# Patient Record
Sex: FEMALE | Race: WHITE | NOT HISPANIC OR LATINO | Employment: PART TIME | URBAN - METROPOLITAN AREA
[De-identification: names, ages, dates, MRNs, and addresses within clinical notes are randomized per-mention and may not be internally consistent; named-entity substitution may affect disease eponyms.]

---

## 2017-01-10 ENCOUNTER — LAB (OUTPATIENT)
Dept: LAB | Facility: HOSPITAL | Age: 57
End: 2017-01-10

## 2017-01-10 ENCOUNTER — CONSULT (OUTPATIENT)
Dept: ONCOLOGY | Facility: CLINIC | Age: 57
End: 2017-01-10

## 2017-01-10 VITALS
HEART RATE: 82 BPM | WEIGHT: 178.8 LBS | TEMPERATURE: 98.1 F | DIASTOLIC BLOOD PRESSURE: 82 MMHG | RESPIRATION RATE: 16 BRPM | SYSTOLIC BLOOD PRESSURE: 142 MMHG | BODY MASS INDEX: 31.68 KG/M2 | HEIGHT: 63 IN

## 2017-01-10 DIAGNOSIS — R53.82 CHRONIC FATIGUE: ICD-10-CM

## 2017-01-10 DIAGNOSIS — Z86.718 HISTORY OF DEEP VENOUS THROMBOSIS (DVT) OF DISTAL VEIN OF LEFT LOWER EXTREMITY: ICD-10-CM

## 2017-01-10 DIAGNOSIS — T14.8XXA BRUISING: Primary | ICD-10-CM

## 2017-01-10 LAB
BASOPHILS # BLD AUTO: 0.05 10*3/MM3 (ref 0–0.1)
BASOPHILS NFR BLD AUTO: 0.8 % (ref 0–1.1)
DEPRECATED RDW RBC AUTO: 34.7 FL (ref 37–49)
EOSINOPHIL # BLD AUTO: 0.17 10*3/MM3 (ref 0–0.36)
EOSINOPHIL NFR BLD AUTO: 2.7 % (ref 1–5)
ERYTHROCYTE [DISTWIDTH] IN BLOOD BY AUTOMATED COUNT: 12.1 % (ref 11.7–14.5)
HCT VFR BLD AUTO: 40 % (ref 34–45)
HGB BLD-MCNC: 13.8 G/DL (ref 11.5–14.9)
IMM GRANULOCYTES # BLD: 0.02 10*3/MM3 (ref 0–0.03)
IMM GRANULOCYTES NFR BLD: 0.3 % (ref 0–0.5)
LYMPHOCYTES # BLD AUTO: 2.91 10*3/MM3 (ref 1–3.5)
LYMPHOCYTES NFR BLD AUTO: 46 % (ref 20–49)
MCH RBC QN AUTO: 27.7 PG (ref 27–33)
MCHC RBC AUTO-ENTMCNC: 34.5 G/DL (ref 32–35)
MCV RBC AUTO: 80.2 FL (ref 83–97)
MONOCYTES # BLD AUTO: 0.31 10*3/MM3 (ref 0.25–0.8)
MONOCYTES NFR BLD AUTO: 4.9 % (ref 4–12)
NEUTROPHILS # BLD AUTO: 2.87 10*3/MM3 (ref 1.5–7)
NEUTROPHILS NFR BLD AUTO: 45.3 % (ref 39–75)
NRBC BLD MANUAL-RTO: 0 /100 WBC (ref 0–0)
PLATELET # BLD AUTO: 225 10*3/MM3 (ref 150–375)
PMV BLD AUTO: 10.5 FL (ref 8.9–12.1)
RBC # BLD AUTO: 4.99 10*6/MM3 (ref 3.9–5)
WBC NRBC COR # BLD: 6.33 10*3/MM3 (ref 4–10)

## 2017-01-10 PROCEDURE — 85025 COMPLETE CBC W/AUTO DIFF WBC: CPT | Performed by: INTERNAL MEDICINE

## 2017-01-10 PROCEDURE — 36416 COLLJ CAPILLARY BLOOD SPEC: CPT | Performed by: INTERNAL MEDICINE

## 2017-01-10 PROCEDURE — 99244 OFF/OP CNSLTJ NEW/EST MOD 40: CPT | Performed by: INTERNAL MEDICINE

## 2017-01-10 RX ORDER — CETIRIZINE HYDROCHLORIDE 10 MG/1
10 TABLET ORAL DAILY
COMMUNITY

## 2017-01-10 NOTE — PROGRESS NOTES
Subjective     REASON FOR CONSULTATION:  Provide an opinion on any further workup or treatment on:    Easy bruisability                       REQUESTING PHYSICIAN: Mookie Ramos MD      RECORDS OBTAINED:  Records of the patients history including those obtained from the referring provider were reviewed and summarized in detail.    HISTORY OF PRESENT ILLNESS:      Shannon IQBAL is a 56 y.o. patient was referred by Mookie Ramos MD for evaluation of easy bruisability.  Patient reports noticing bruises over multiple areas of her body over the past 2 years.  She does not remember a precipitating trauma.  She is not having problems with bleeding except for some gum bleeding.    The patient has history of left leg DVT 2 years ago that was treated with warfarin for 3 months.  She did not have excessive bruising while on warfarin.      Patient reports current headaches.  She takes Aleve when necessary for the headaches.  She usually takes 2 tablets but she does not take Aleve on a regular basis.    Past Medical History   Diagnosis Date   • Migraine        No past surgical history on file.  D&C on 8/1/2008.  Right breast biopsy on 6 2010 for a benign lesion.  Thyroid biopsy on 12/10/2015 for a benign lesion.    MEDICATIONS:    Current Outpatient Prescriptions:   •  cetirizine (zyrTEC) 10 MG tablet, Take 10 mg by mouth Daily., Disp: , Rfl:   •  Propranolol HCl (INDERAL PO), Take  by mouth., Disp: , Rfl:      ALLERGIES:  No Known Allergies     Social History     Social History   • Marital status:      Spouse name: N/A   • Number of children: N/A   • Years of education: N/A     Occupational History   •  [employer]     Social History Main Topics   • Smoking status: Never Smoker   • Smokeless tobacco: Not on file   • Alcohol use Not on file   • Drug use: Not on file   • Sexual activity: Not on file     Other Topics Concern   • Not on file     Social History Narrative       Cancer-related family history is not on file. no  "history of bleeding disorder.    REVIEW OF SYSTEMS:  GENERAL:  No fever or chills. No weight change.  Fatigue.  SKIN:  No rashes or lesions.  HEME/LYMPH: No anemia, easy bruisability or enlarged lymph nodes.  EYES:  No vision changes or diplopia.  ENT:  No mouth or gum bleeding, epistaxis, hoarseness or dysphagia.  RESPIRATORY:  No cough, shortness of breath, hemoptysis or wheezing.  CVS:  Episodes of sharp sided chest pain with abduction of the arm.  GI:  No abdominal pain, nausea, vomiting, constipation, diarrhea, melena or hematochezia.  :  No dysuria, hematuria or increased frequency.   MUSCULOSKELETAL:  Pain in the left knee.  NEUROLOGICAL:  No dizziness, global weakness, loss of consciousness or seizures.  PSYCHIATRIC:  No anxiety, mood changes or depression.  .    Objective   VITAL SIGNS:  Vitals:    01/10/17 0824   BP: 142/82   Pulse: 82   Resp: 16   Temp: 98.1 °F (36.7 °C)   Weight: 178 lb 12.8 oz (81.1 kg)   Height: 62.99\" (160 cm)  Comment: without shoes   PainSc: 5  Comment: pain from left breast       Wt Readings from Last 3 Encounters:   01/10/17 178 lb 12.8 oz (81.1 kg)       PHYSICAL EXAMINATION  GENERAL:  The patient appears in good general condition not in acute distress.  SKIN:  No skin rashes or lesions. No ecchymosis or petechiae.  Changes from a previous ecchymosis over the right leg proximally.  HEAD:  Normocephalic.  EYES: No jaundice. No pallor.   NECK:  Supple with good ROM. No thyromegaly or masses.  LYMPHATICS:  No cervical, supraclavicular or axillary lymphadenopathy.  CHEST:  Lungs clear to auscultation. No added sounds.  CARDIAC:  Normal S1 & S2. No murmurs.  ABDOMEN:  Soft and non-tender. No palpable hepatosplenomegaly or masses.  EXTREMITIES:  No cyanosis or edema. No calf tenderness.  NEUROLOGICAL:  No focal neurological deficits.      RESULT REVIEW:     Results from last 7 days  Lab Units 01/10/17  0729   WBC 10*3/mm3 6.33   NEUTROS ABS 10*3/mm3 2.87   HEMOGLOBIN g/dL 13.8 "   HEMATOCRIT % 40.0   PLATELETS 10*3/mm3 225           Assessment/Plan   1.  Easy bruisability.  Started about 2 years ago.  Patient reports having bruises over her upper and lower extremities without any precipitating trauma.  She has no prior history of bleeding.  She had thyroid and breast biopsies and she had 3 normal vaginal deliveries without problems with bleeding.  On review of her medication history, she takes Aleve when necessary for headaches and this may be contributing to platelet dysfunction.  Family history is negative for bleeding disorder.  I asked her to stop taking Aleve and to take Tylenol instead.  I also recommended blood workup with platelet function analysis, PT, PTT and von Willebrand panel to be done on 1/13/17.  She would be about 10 days from the last time she took Aleve.    2.  Fatigue.  This is concerning for iron or B12 deficiency.  I'll obtain ferritin, iron panel and B12 levels.    3.  History of left leg DVT 2 years ago.  She was anticoagulated with Coumadin for 3 months and she is currently asymptomatic.    4.  Patient reports pain in the left side of the chest that is sharp and develops when she moves her arm and she also reports pain in the left knee with locking feeling.  I asked her to see her PCP soon for further evaluation.    We will see the patient follow-up in 3 weeks to review the results.        Malik Mccormack MD  01/10/17

## 2017-01-13 ENCOUNTER — APPOINTMENT (OUTPATIENT)
Dept: LAB | Facility: HOSPITAL | Age: 57
End: 2017-01-13

## 2017-01-16 ENCOUNTER — LAB (OUTPATIENT)
Dept: LAB | Facility: HOSPITAL | Age: 57
End: 2017-01-16

## 2017-01-16 DIAGNOSIS — T14.8XXA BRUISING: ICD-10-CM

## 2017-01-16 DIAGNOSIS — R53.82 CHRONIC FATIGUE: ICD-10-CM

## 2017-01-16 LAB
CLOSURE TME COLL+ADP BLD: 63 SECONDS (ref 52–122)
CLOSURE TME COLL+EPINEP BLD: 120 SECONDS (ref 68–148)
FERRITIN SERPL-MCNC: 78.6 NG/ML (ref 11–207)
INR PPP: 0.95 (ref 0.9–1.1)
IRON 24H UR-MRATE: 65 MCG/DL (ref 37–145)
IRON SATN MFR SERPL: 19 % (ref 14–48)
PROTHROMBIN TIME: 12.3 SECONDS (ref 11.7–14.2)
TIBC SERPL-MCNC: 336 MCG/DL (ref 249–505)
TRANSFERRIN SERPL-MCNC: 240 MG/DL (ref 200–360)
VIT B12 BLD-MCNC: 659 PG/ML (ref 250–999)

## 2017-01-16 PROCEDURE — 85610 PROTHROMBIN TIME: CPT | Performed by: INTERNAL MEDICINE

## 2017-01-16 PROCEDURE — 84466 ASSAY OF TRANSFERRIN: CPT | Performed by: INTERNAL MEDICINE

## 2017-01-16 PROCEDURE — 36415 COLL VENOUS BLD VENIPUNCTURE: CPT | Performed by: INTERNAL MEDICINE

## 2017-01-16 PROCEDURE — 83540 ASSAY OF IRON: CPT | Performed by: INTERNAL MEDICINE

## 2017-01-16 PROCEDURE — 82607 VITAMIN B-12: CPT | Performed by: INTERNAL MEDICINE

## 2017-01-16 PROCEDURE — 82728 ASSAY OF FERRITIN: CPT | Performed by: INTERNAL MEDICINE

## 2017-01-16 PROCEDURE — 85576 BLOOD PLATELET AGGREGATION: CPT | Performed by: INTERNAL MEDICINE

## 2017-01-18 LAB
FACT VIII ACT/NOR PPP: 157 % (ref 57–163)
Lab: NORMAL
VW INTERPRETATION: NORMAL
VWF AG ACT/NOR PPP IA: 148 % (ref 50–200)
VWF CP PPP QL: 134 % (ref 50–200)

## 2017-01-30 ENCOUNTER — TRANSCRIBE ORDERS (OUTPATIENT)
Dept: MAMMOGRAPHY | Facility: HOSPITAL | Age: 57
End: 2017-01-30

## 2017-01-30 DIAGNOSIS — Z12.31 VISIT FOR SCREENING MAMMOGRAM: Primary | ICD-10-CM

## 2017-02-13 ENCOUNTER — APPOINTMENT (OUTPATIENT)
Dept: LAB | Facility: HOSPITAL | Age: 57
End: 2017-02-13

## 2017-02-13 ENCOUNTER — OFFICE VISIT (OUTPATIENT)
Dept: ONCOLOGY | Facility: CLINIC | Age: 57
End: 2017-02-13

## 2017-02-13 VITALS
SYSTOLIC BLOOD PRESSURE: 152 MMHG | TEMPERATURE: 98.1 F | WEIGHT: 179.8 LBS | BODY MASS INDEX: 31.86 KG/M2 | HEART RATE: 74 BPM | DIASTOLIC BLOOD PRESSURE: 92 MMHG | OXYGEN SATURATION: 95 % | HEIGHT: 63 IN | RESPIRATION RATE: 16 BRPM

## 2017-02-13 DIAGNOSIS — Z86.718 HISTORY OF DEEP VENOUS THROMBOSIS (DVT) OF DISTAL VEIN OF LEFT LOWER EXTREMITY: ICD-10-CM

## 2017-02-13 DIAGNOSIS — T14.8XXA BRUISING: Primary | ICD-10-CM

## 2017-02-13 DIAGNOSIS — E61.1 IRON DEFICIENCY: ICD-10-CM

## 2017-02-13 PROCEDURE — G0463 HOSPITAL OUTPT CLINIC VISIT: HCPCS | Performed by: INTERNAL MEDICINE

## 2017-02-13 PROCEDURE — 99214 OFFICE O/P EST MOD 30 MIN: CPT | Performed by: INTERNAL MEDICINE

## 2017-02-13 NOTE — PROGRESS NOTES
Subjective     REASON FOR FOLLOW UP:      · Easy bruisability  · Fatigue                         HISTORY OF PRESENT ILLNESS:      Shannon IQBAL is a 56 y.o. patient who was referred by Mookie Ramos MD for evaluation of easy bruisability.      Patient returns today for follow-up.  She is not having problems with bruising at present.  She continues feeling tired.  She was experiencing chest pain several weeks ago but this has resolved.    Patient is not noticing blood in the stool.  She had a colonoscopy in February 2013.  A polyp was excised from the cecum and pathology examination revealed a hyperplastic polyp.    Patient reports intermittent muscle and joint pain.  Tylenol is usually not effective and she usually gets better results with Aleve.      Patient reports occasional dizzy spells.  She is having high blood pressure readings.    Past Medical History   Diagnosis Date   • Arthritis    • H/O foreign travel 08/2016     [place]   • Migraine        No past surgical history on file.  D&C on 8/1/2008.  Right breast biopsy on 6 2010 for a benign lesion.  Thyroid biopsy on 12/10/2015 for a benign lesion.    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 No history of bleeding disorder.    REVIEW OF SYSTEMS:  GENERAL:  No fever or chills. No weight change.   "Fatigue.  SKIN:  No rashes or lesions.  HEME/LYMPH: Easy bruisability.  RESPIRATORY:  No cough, shortness of breath, hemoptysis or wheezing.  CVS:  Chest pain has resolved.  GI:  No abdominal pain, nausea, vomiting, constipation, diarrhea, melena or hematochezia.  :  No dysuria, hematuria or increased frequency.   MUSCULOSKELETAL:  Pain in the left knee.  NEUROLOGICAL:  Episodes of dizziness.  PSYCHIATRIC:  No anxiety, mood changes or depression.  .    Objective   VITAL SIGNS:  Vitals:    02/13/17 1122   BP: 152/92   Pulse: 74   Resp: 16   Temp: 98.1 °F (36.7 °C)   TempSrc: Oral   SpO2: 95%   Weight: 179 lb 12.8 oz (81.6 kg)   Height: 62.99\" (160 cm)   PainSc: 0-No pain       Wt Readings from Last 3 Encounters:   02/13/17 179 lb 12.8 oz (81.6 kg)   01/10/17 178 lb 12.8 oz (81.1 kg)       PHYSICAL EXAMINATION  GENERAL:  The patient appears in good general condition not in acute distress.  SKIN:  No skin rashes or lesions. No ecchymosis or petechiae.    EXTREMITIES:  No cyanosis or edema. No calf tenderness.  NEUROLOGICAL:  No focal neurological deficits.      RESULT REVIEW:     Results for orders placed or performed in visit on 01/16/17   Ferritin   Result Value Ref Range    Ferritin 78.60 11.00 - 207.00 ng/mL   Iron Profile   Result Value Ref Range    Iron 65 37 - 145 mcg/dL    Iron Saturation 19 14 - 48 %    Transferrin 240 200 - 360 mg/dL    TIBC 336 249 - 505 mcg/dL   Vitamin B12   Result Value Ref Range    Vitamin B-12 659 250 - 999 pg/mL   Platelet Function Test   Result Value Ref Range    Collagen/Epinephrine 120 68 - 148 Seconds    Collagen/ADP 63 52 - 122 Seconds   Protime-INR   Result Value Ref Range    Protime 12.3 11.7 - 14.2 Seconds    INR 0.95 0.90 - 1.10   Von Willebrand Panel   Result Value Ref Range    Factor VIII Activity 157 57 - 163 %    Von Willebrand Ag 148 50 - 200 %    VWF Activity 134 50 - 200 %   Coag Studies Interp Report   Result Value Ref Range    von Willebrand Interp Note     PDF " Image .        Assessment/Plan   1.  Easy bruisability.  Started about 2 years ago.  Patient reports having bruises over her upper and lower extremities without any precipitating trauma.  She has no prior history of bleeding.  She had thyroid and breast biopsies and she had 3 normal vaginal deliveries without problems with bleeding.  On review of her medication history, she takes Aleve when necessary for headaches and this may be contributing to platelet dysfunction.  Family history is negative for bleeding disorder.  I asked her to stop taking Aleve and to take Tylenol instead.  I obtained a blood workup with platelet function analysis, PT, PTT and von Willebrand panel and the results are normal.  I reassured the patient that there is no evidence of underlying bleeding disorder.  I would not recommend additional workup.  She states that Tylenol does not usually work for her pain.  I asked her to use as little of Aleve as possible.    2.  Fatigue.  Transferrin saturation is 19% and ferritin is below 100.  I asked her to take oral iron once a day for 3 months.  She had a colonoscopy in 2013 which revealed a polyp that was excised pathology examination showing a hyperplastic polyp.    3.  History of left leg DVT 2 years ago.      4.  Episodes of dizziness.  She has elevated blood pressure and had elevated blood pressure readings during the episodes of dizziness.  I asked her to see her PCP soon for this.      Since the patient does not have evidence of a bleeding disorder, I did not recommend additional follow-up at our office.  She will see her PCP in follow-up soon regarding her elevated BP.        Malik Mccormack MD  02/13/17

## 2017-02-21 ENCOUNTER — HOSPITAL ENCOUNTER (OUTPATIENT)
Dept: MAMMOGRAPHY | Facility: HOSPITAL | Age: 57
Discharge: HOME | End: 2017-02-21
Admitting: OBSTETRICS & GYNECOLOGY

## 2017-02-21 DIAGNOSIS — Z12.31 VISIT FOR SCREENING MAMMOGRAM: ICD-10-CM

## 2017-02-21 PROCEDURE — G0202 SCR MAMMO BI INCL CAD: HCPCS

## 2018-05-03 ENCOUNTER — TRANSCRIBE ORDERS (OUTPATIENT)
Dept: ADMINISTRATIVE | Facility: HOSPITAL | Age: 58
End: 2018-05-03

## 2018-05-03 DIAGNOSIS — Z12.31 SCREENING MAMMOGRAM, ENCOUNTER FOR: Primary | ICD-10-CM

## 2018-05-17 ENCOUNTER — HOSPITAL ENCOUNTER (OUTPATIENT)
Dept: MAMMOGRAPHY | Facility: HOSPITAL | Age: 58
Discharge: HOME | End: 2018-05-17
Admitting: OBSTETRICS & GYNECOLOGY

## 2018-05-17 DIAGNOSIS — Z12.31 SCREENING MAMMOGRAM, ENCOUNTER FOR: ICD-10-CM

## 2018-05-17 PROCEDURE — 77067 SCR MAMMO BI INCL CAD: CPT

## 2018-05-17 PROCEDURE — 77063 BREAST TOMOSYNTHESIS BI: CPT

## 2019-04-05 ENCOUNTER — TRANSCRIBE ORDERS (OUTPATIENT)
Dept: ADMINISTRATIVE | Facility: HOSPITAL | Age: 59
End: 2019-04-05

## 2019-04-05 DIAGNOSIS — Z12.31 VISIT FOR SCREENING MAMMOGRAM: Primary | ICD-10-CM

## 2019-05-21 ENCOUNTER — APPOINTMENT (OUTPATIENT)
Dept: MAMMOGRAPHY | Facility: HOSPITAL | Age: 59
End: 2019-05-21

## 2019-05-22 ENCOUNTER — HOSPITAL ENCOUNTER (OUTPATIENT)
Dept: MAMMOGRAPHY | Facility: HOSPITAL | Age: 59
Discharge: HOME | End: 2019-05-22
Admitting: SPECIALIST

## 2019-05-22 DIAGNOSIS — Z12.31 VISIT FOR SCREENING MAMMOGRAM: ICD-10-CM

## 2019-05-22 PROCEDURE — 77067 SCR MAMMO BI INCL CAD: CPT

## 2019-05-22 PROCEDURE — 77063 BREAST TOMOSYNTHESIS BI: CPT

## 2025-07-28 ENCOUNTER — TRANSCRIBE ORDERS (OUTPATIENT)
Dept: LAB | Facility: HOSPITAL | Age: 65
End: 2025-07-28
Payer: COMMERCIAL

## 2025-07-28 ENCOUNTER — HOSPITAL ENCOUNTER (OUTPATIENT)
Dept: CARDIOLOGY | Facility: HOSPITAL | Age: 65
Discharge: HOME | End: 2025-07-28
Payer: COMMERCIAL

## 2025-07-28 ENCOUNTER — LAB (OUTPATIENT)
Dept: LAB | Facility: HOSPITAL | Age: 65
End: 2025-07-28
Payer: COMMERCIAL

## 2025-07-28 DIAGNOSIS — R22.0 CHEEK MASS: ICD-10-CM

## 2025-07-28 DIAGNOSIS — R22.0 CHEEK MASS: Primary | ICD-10-CM

## 2025-07-28 LAB
ANION GAP SERPL CALCULATED.3IONS-SCNC: 11.4 MMOL/L (ref 5–15)
BASOPHILS # BLD AUTO: 0.06 10*3/MM3 (ref 0–0.2)
BASOPHILS NFR BLD AUTO: 0.8 % (ref 0–1.5)
BUN SERPL-MCNC: 12 MG/DL (ref 8–23)
BUN/CREAT SERPL: 15.4 (ref 7–25)
CALCIUM SPEC-SCNC: 9.5 MG/DL (ref 8.6–10.5)
CHLORIDE SERPL-SCNC: 104 MMOL/L (ref 98–107)
CO2 SERPL-SCNC: 27.6 MMOL/L (ref 22–29)
CREAT SERPL-MCNC: 0.78 MG/DL (ref 0.57–1)
DEPRECATED RDW RBC AUTO: 37.8 FL (ref 37–54)
EGFRCR SERPLBLD CKD-EPI 2021: 84.9 ML/MIN/1.73
EOSINOPHIL # BLD AUTO: 0.23 10*3/MM3 (ref 0–0.4)
EOSINOPHIL NFR BLD AUTO: 3.1 % (ref 0.3–6.2)
ERYTHROCYTE [DISTWIDTH] IN BLOOD BY AUTOMATED COUNT: 12.6 % (ref 12.3–15.4)
GLUCOSE SERPL-MCNC: 114 MG/DL (ref 65–99)
HCT VFR BLD AUTO: 41 % (ref 34–46.6)
HGB BLD-MCNC: 13.9 G/DL (ref 12–15.9)
IMM GRANULOCYTES # BLD AUTO: 0.02 10*3/MM3 (ref 0–0.05)
IMM GRANULOCYTES NFR BLD AUTO: 0.3 % (ref 0–0.5)
LYMPHOCYTES # BLD AUTO: 3.05 10*3/MM3 (ref 0.7–3.1)
LYMPHOCYTES NFR BLD AUTO: 41 % (ref 19.6–45.3)
MCH RBC QN AUTO: 28.2 PG (ref 26.6–33)
MCHC RBC AUTO-ENTMCNC: 33.9 G/DL (ref 31.5–35.7)
MCV RBC AUTO: 83.2 FL (ref 79–97)
MONOCYTES # BLD AUTO: 0.32 10*3/MM3 (ref 0.1–0.9)
MONOCYTES NFR BLD AUTO: 4.3 % (ref 5–12)
NEUTROPHILS NFR BLD AUTO: 3.76 10*3/MM3 (ref 1.7–7)
NEUTROPHILS NFR BLD AUTO: 50.5 % (ref 42.7–76)
NRBC BLD AUTO-RTO: 0 /100 WBC (ref 0–0.2)
PLATELET # BLD AUTO: 242 10*3/MM3 (ref 140–450)
PMV BLD AUTO: 10 FL (ref 6–12)
POTASSIUM SERPL-SCNC: 4.3 MMOL/L (ref 3.5–5.2)
RBC # BLD AUTO: 4.93 10*6/MM3 (ref 3.77–5.28)
SODIUM SERPL-SCNC: 143 MMOL/L (ref 136–145)
WBC NRBC COR # BLD AUTO: 7.44 10*3/MM3 (ref 3.4–10.8)

## 2025-07-28 PROCEDURE — 85025 COMPLETE CBC W/AUTO DIFF WBC: CPT

## 2025-07-28 PROCEDURE — 80048 BASIC METABOLIC PNL TOTAL CA: CPT

## 2025-07-28 PROCEDURE — 93005 ELECTROCARDIOGRAM TRACING: CPT | Performed by: STUDENT IN AN ORGANIZED HEALTH CARE EDUCATION/TRAINING PROGRAM

## 2025-07-28 PROCEDURE — 36415 COLL VENOUS BLD VENIPUNCTURE: CPT

## 2025-07-29 LAB
QT INTERVAL: 394 MS
QTC INTERVAL: 431 MS